# Patient Record
Sex: FEMALE | Race: WHITE | NOT HISPANIC OR LATINO | URBAN - METROPOLITAN AREA
[De-identification: names, ages, dates, MRNs, and addresses within clinical notes are randomized per-mention and may not be internally consistent; named-entity substitution may affect disease eponyms.]

---

## 2024-10-07 ENCOUNTER — EMERGENCY (EMERGENCY)
Facility: HOSPITAL | Age: 24
LOS: 1 days | Discharge: HOME | End: 2024-10-07
Admitting: EMERGENCY MEDICINE
Payer: COMMERCIAL

## 2024-10-07 VITALS
SYSTOLIC BLOOD PRESSURE: 106 MMHG | HEART RATE: 89 BPM | DIASTOLIC BLOOD PRESSURE: 65 MMHG | TEMPERATURE: 98 F | OXYGEN SATURATION: 97 % | HEIGHT: 66 IN | WEIGHT: 119.93 LBS | RESPIRATION RATE: 18 BRPM

## 2024-10-07 PROCEDURE — 99284 EMERGENCY DEPT VISIT MOD MDM: CPT

## 2024-10-07 RX ORDER — TETANUS TOXOID, REDUCED DIPHTHERIA TOXOID AND ACELLULAR PERTUSSIS VACCINE, ADSORBED 5; 2.5; 8; 8; 2.5 [IU]/.5ML; [IU]/.5ML; UG/.5ML; UG/.5ML; UG/.5ML
0.5 SUSPENSION INTRAMUSCULAR ONCE
Refills: 0 | Status: COMPLETED | OUTPATIENT
Start: 2024-10-07 | End: 2024-10-07

## 2024-10-07 RX ADMIN — TETANUS TOXOID, REDUCED DIPHTHERIA TOXOID AND ACELLULAR PERTUSSIS VACCINE, ADSORBED 0.5 MILLILITER(S): 5; 2.5; 8; 8; 2.5 SUSPENSION INTRAMUSCULAR at 19:42

## 2024-10-07 NOTE — ED ADULT NURSE NOTE - OBJECTIVE STATEMENT
Patient presents with complaints of laceration to the left hand after accidently cutting herself with a , c/o pain and bleeding to site. No active bleeding at this time. Not up to date with tetanus. + sensation and neurovascular intact. Denies numbness and tingling.

## 2024-10-07 NOTE — ED PROVIDER NOTE - CLINICAL SUMMARY MEDICAL DECISION MAKING FREE TEXT BOX
24-year-old female presents this emergency room with a very small laceration that is extremely superficial left hand  Neuro vas intact, cap refill less than 2 seconds  Patient is tetanus up-to-date, will updated  All results with patient.  Patient understands and agrees plan.  Agreed to follow-up primary care doctor in 2 days  Strict return precautions given.

## 2024-10-07 NOTE — ED PROVIDER NOTE - OBJECTIVE STATEMENT
24-year-old female presents this emergency room for a possible laceration just proximal to the left thumb on the lateral aspect.  States that she was using a , and it stabbed through her glove.  States that it did started gushing blood, she got worried so she came in to get evaluated.  Tetanus is not up-to-date.  Bleeding has been controlled after pressure dressing.  States that it is slightly painful.

## 2024-10-07 NOTE — ED PROVIDER NOTE - PATIENT PORTAL LINK FT
You can access the FollowMyHealth Patient Portal offered by [facility] by registering at the following website: [email or web address]. By joining [employer]’s FollowMyHealth portal, you will also be able to view your health information using other applications (apps) compatible with our system.

## 2024-10-07 NOTE — ED ADULT TRIAGE NOTE - CHIEF COMPLAINT QUOTE
pt c/o laceration to the left hand after accidently cutting herself with a . pt states last tetanus was a few years ago.

## 2024-10-07 NOTE — ED ADULT NURSE NOTE - NSFALLUNIVINTERV_ED_ALL_ED
Bed/Stretcher in lowest position, wheels locked, appropriate side rails in place/Call bell, personal items and telephone in reach/Instruct patient to call for assistance before getting out of bed/chair/stretcher/Non-slip footwear applied when patient is off stretcher/[place] to call system/Physically safe environment - no spills, clutter or unnecessary equipment/Purposeful proactive rounding/Room/bathroom lighting operational, light cord in reach

## 2024-10-07 NOTE — ED PROVIDER NOTE - PHYSICAL EXAMINATION
General: well developed, well nourished, no distress  Eyes: no scleral injection  Neck: non-tender, full range of motion, supple.   Respiratory: unlabored breathing  Cardiovascular: no central cyanosis  Musculoskeletal: normal gait.   Extremities: normal range of motion, non-tender.  Neurologic: alert, oriented to person, oriented to place, oriented to time.    Skin: normal color.  Negative For: rash  0.5 cm linear laceration is extremely superficial appreciated on the lateral aspect of the left hand just proximal to this.  Range of motion in the is intact.  Neurovascular intact, cap refill less than 2 seconds.  Psychiatric: normal affect, normal insight, normal concentration

## 2024-10-11 DIAGNOSIS — Z23 ENCOUNTER FOR IMMUNIZATION: ICD-10-CM
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
## 2024-10-11 DIAGNOSIS — S61.412A LACERATION WITHOUT FOREIGN BODY OF LEFT HAND, INITIAL ENCOUNTER: ICD-10-CM
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
